# Patient Record
Sex: FEMALE | Race: BLACK OR AFRICAN AMERICAN
[De-identification: names, ages, dates, MRNs, and addresses within clinical notes are randomized per-mention and may not be internally consistent; named-entity substitution may affect disease eponyms.]

---

## 2017-09-11 ENCOUNTER — HOSPITAL ENCOUNTER (EMERGENCY)
Dept: HOSPITAL 35 - ER | Age: 57
Discharge: NURSING, REHAB OR LONG-TERM CARE | End: 2017-09-11
Payer: COMMERCIAL

## 2017-09-11 VITALS — HEIGHT: 63 IN | BODY MASS INDEX: 18.61 KG/M2 | WEIGHT: 105.01 LBS

## 2017-09-11 DIAGNOSIS — N39.0: ICD-10-CM

## 2017-09-11 DIAGNOSIS — R56.9: Primary | ICD-10-CM

## 2017-09-11 LAB
ALBUMIN SERPL-MCNC: 4.2 G/DL (ref 3.4–5)
ALP SERPL-CCNC: 78 U/L (ref 46–116)
ALT SERPL-CCNC: 21 U/L (ref 30–65)
ANION GAP SERPL CALC-SCNC: 8 MMOL/L (ref 7–16)
AST SERPL-CCNC: 26 U/L (ref 15–37)
BILIRUB DIRECT SERPL-MCNC: 0.1 MG/DL
BILIRUB SERPL-MCNC: 0.5 MG/DL
BILIRUB UR-MCNC: NEGATIVE MG/DL
BUN SERPL-MCNC: 12 MG/DL (ref 7–18)
CALCIUM SERPL-MCNC: 9.6 MG/DL (ref 8.5–10.1)
CHLORIDE SERPL-SCNC: 105 MMOL/L (ref 98–107)
CO2 SERPL-SCNC: 28 MMOL/L (ref 21–32)
COLOR UR: YELLOW
CREAT SERPL-MCNC: 1.3 MG/DL (ref 0.6–1)
GLUCOSE SERPL-MCNC: 100 MG/DL (ref 74–106)
KETONES UR STRIP-MCNC: NEGATIVE MG/DL
NITRITE UR QL STRIP: NEGATIVE
POTASSIUM SERPL-SCNC: 4.5 MMOL/L (ref 3.5–5.1)
PROT SERPL-MCNC: 7.7 G/DL (ref 6.4–8.2)
RBC # UR STRIP: NEGATIVE /UL
SODIUM SERPL-SCNC: 141 MMOL/L (ref 136–145)
SP GR UR STRIP: 1.01 (ref 1–1.03)
URINE GLUCOSE-RANDOM*: NEGATIVE
URINE PROTEIN (DIPSTICK): NEGATIVE
UROBILINOGEN UR STRIP-ACNC: 0.2 E.U./DL (ref 0.2–1)

## 2019-07-16 ENCOUNTER — HOSPITAL ENCOUNTER (EMERGENCY)
Dept: HOSPITAL 35 - ER | Age: 59
Discharge: HOME | End: 2019-07-16
Payer: COMMERCIAL

## 2019-07-16 VITALS — SYSTOLIC BLOOD PRESSURE: 119 MMHG | DIASTOLIC BLOOD PRESSURE: 68 MMHG

## 2019-07-16 VITALS — HEIGHT: 68 IN | BODY MASS INDEX: 19.4 KG/M2 | WEIGHT: 128 LBS

## 2019-07-16 DIAGNOSIS — E87.5: ICD-10-CM

## 2019-07-16 DIAGNOSIS — N18.9: ICD-10-CM

## 2019-07-16 DIAGNOSIS — G40.909: Primary | ICD-10-CM

## 2019-07-16 DIAGNOSIS — Z87.440: ICD-10-CM

## 2019-07-16 DIAGNOSIS — E78.5: ICD-10-CM

## 2019-07-16 DIAGNOSIS — Z79.899: ICD-10-CM

## 2019-07-16 LAB
ALBUMIN SERPL-MCNC: 4 G/DL (ref 3.4–5)
ALT SERPL-CCNC: 20 U/L (ref 30–65)
ANION GAP SERPL CALC-SCNC: 8 MMOL/L (ref 7–16)
AST SERPL-CCNC: 32 U/L (ref 15–37)
BASOPHILS NFR BLD AUTO: 0.5 % (ref 0–2)
BILIRUB SERPL-MCNC: 0.6 MG/DL
BILIRUB UR-MCNC: NEGATIVE MG/DL
BUN SERPL-MCNC: 14 MG/DL (ref 7–18)
CALCIUM SERPL-MCNC: 9.7 MG/DL (ref 8.5–10.1)
CHLORIDE SERPL-SCNC: 104 MMOL/L (ref 98–107)
CO2 SERPL-SCNC: 29 MMOL/L (ref 21–32)
COLOR UR: YELLOW
CREAT SERPL-MCNC: 1.6 MG/DL (ref 0.6–1)
EOSINOPHIL NFR BLD: 0.3 % (ref 0–3)
ERYTHROCYTE [DISTWIDTH] IN BLOOD BY AUTOMATED COUNT: 13.9 % (ref 10.5–14.5)
GLUCOSE SERPL-MCNC: 102 MG/DL (ref 74–106)
GRANULOCYTES NFR BLD MANUAL: 55.4 % (ref 36–66)
HCT VFR BLD CALC: 40 % (ref 37–47)
HGB BLD-MCNC: 13.1 GM/DL (ref 12–15)
KETONES UR STRIP-MCNC: NEGATIVE MG/DL
LYMPHOCYTES NFR BLD AUTO: 35.3 % (ref 24–44)
MAGNESIUM SERPL-MCNC: 1.9 MG/DL (ref 1.8–2.4)
MCH RBC QN AUTO: 29.2 PG (ref 26–34)
MCHC RBC AUTO-ENTMCNC: 32.8 G/DL (ref 28–37)
MCV RBC: 89 FL (ref 80–100)
MONOCYTES NFR BLD: 8.5 % (ref 1–8)
NEUTROPHILS # BLD: 3.1 THOU/UL (ref 1.4–8.2)
PLATELET # BLD: 302 THOU/UL (ref 150–400)
POTASSIUM SERPL-SCNC: 5.2 MMOL/L (ref 3.5–5.1)
PROT SERPL-MCNC: 7.9 G/DL (ref 6.4–8.2)
RBC # BLD AUTO: 4.49 MIL/UL (ref 4.2–5)
RBC # UR STRIP: NEGATIVE /UL
SODIUM SERPL-SCNC: 141 MMOL/L (ref 136–145)
SP GR UR STRIP: 1.01 (ref 1–1.03)
SQUAMOUS: (no result) /LPF (ref 0–3)
URINE CLARITY: CLEAR
URINE GLUCOSE-RANDOM*: NEGATIVE
URINE LEUKOCYTES-REFLEX: (no result)
URINE NITRITE-REFLEX: NEGATIVE
URINE PROTEIN (DIPSTICK): NEGATIVE
URINE WBC-REFLEX: (no result) /HPF (ref 0–5)
UROBILINOGEN UR STRIP-ACNC: 0.2 E.U./DL (ref 0.2–1)
WBC # BLD AUTO: 5.6 THOU/UL (ref 4–11)

## 2019-07-28 ENCOUNTER — HOSPITAL ENCOUNTER (EMERGENCY)
Dept: HOSPITAL 35 - ER | Age: 59
Discharge: HOME | End: 2019-07-28
Payer: COMMERCIAL

## 2019-07-28 VITALS — WEIGHT: 128 LBS | BODY MASS INDEX: 18.96 KG/M2 | HEIGHT: 69 IN

## 2019-07-28 VITALS — SYSTOLIC BLOOD PRESSURE: 123 MMHG | DIASTOLIC BLOOD PRESSURE: 79 MMHG

## 2019-07-28 DIAGNOSIS — S30.0XXA: ICD-10-CM
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
## 2019-07-28 DIAGNOSIS — Z79.899: ICD-10-CM

## 2019-07-28 DIAGNOSIS — S90.01XA: Primary | ICD-10-CM
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
## 2019-07-28 LAB
ANION GAP SERPL CALC-SCNC: 7 MMOL/L (ref 7–16)
BASOPHILS NFR BLD AUTO: 0.9 % (ref 0–2)
BILIRUB UR-MCNC: NEGATIVE MG/DL
BUN SERPL-MCNC: 11 MG/DL (ref 7–18)
CALCIUM SERPL-MCNC: 10.2 MG/DL (ref 8.5–10.1)
CHLORIDE SERPL-SCNC: 103 MMOL/L (ref 98–107)
CO2 SERPL-SCNC: 30 MMOL/L (ref 21–32)
COLOR UR: YELLOW
CREAT SERPL-MCNC: 1.3 MG/DL (ref 0.6–1)
EOSINOPHIL NFR BLD: 0.3 % (ref 0–3)
ERYTHROCYTE [DISTWIDTH] IN BLOOD BY AUTOMATED COUNT: 13.8 % (ref 10.5–14.5)
GLUCOSE SERPL-MCNC: 95 MG/DL (ref 74–106)
GRANULOCYTES NFR BLD MANUAL: 60.3 % (ref 36–66)
HCT VFR BLD CALC: 38.9 % (ref 37–47)
HGB BLD-MCNC: 12.8 GM/DL (ref 12–15)
KETONES UR STRIP-MCNC: NEGATIVE MG/DL
LYMPHOCYTES NFR BLD AUTO: 30.4 % (ref 24–44)
MCH RBC QN AUTO: 29.2 PG (ref 26–34)
MCHC RBC AUTO-ENTMCNC: 33 G/DL (ref 28–37)
MCV RBC: 88.5 FL (ref 80–100)
MONOCYTES NFR BLD: 8.1 % (ref 1–8)
NEUTROPHILS # BLD: 3 THOU/UL (ref 1.4–8.2)
PLATELET # BLD: 282 THOU/UL (ref 150–400)
POTASSIUM SERPL-SCNC: 3.8 MMOL/L (ref 3.5–5.1)
RBC # BLD AUTO: 4.4 MIL/UL (ref 4.2–5)
RBC # UR STRIP: NEGATIVE /UL
SODIUM SERPL-SCNC: 140 MMOL/L (ref 136–145)
SP GR UR STRIP: 1.01 (ref 1–1.03)
URINE CLARITY: CLEAR
URINE GLUCOSE-RANDOM*: NEGATIVE
URINE LEUKOCYTES-REFLEX: (no result)
URINE NITRITE-REFLEX: NEGATIVE
URINE PROTEIN (DIPSTICK): NEGATIVE
UROBILINOGEN UR STRIP-ACNC: 0.2 E.U./DL (ref 0.2–1)
WBC # BLD AUTO: 4.9 THOU/UL (ref 4–11)

## 2020-04-18 ENCOUNTER — HOSPITAL ENCOUNTER (INPATIENT)
Dept: HOSPITAL 35 - ER | Age: 60
LOS: 4 days | Discharge: NURSING, REHAB OR LONG-TERM CARE | DRG: 100 | End: 2020-04-22
Attending: INTERNAL MEDICINE | Admitting: INTERNAL MEDICINE
Payer: COMMERCIAL

## 2020-04-18 VITALS — SYSTOLIC BLOOD PRESSURE: 106 MMHG | DIASTOLIC BLOOD PRESSURE: 74 MMHG

## 2020-04-18 VITALS — DIASTOLIC BLOOD PRESSURE: 77 MMHG | SYSTOLIC BLOOD PRESSURE: 117 MMHG

## 2020-04-18 VITALS — BODY MASS INDEX: 23.07 KG/M2 | WEIGHT: 147 LBS | HEIGHT: 67 IN

## 2020-04-18 VITALS — SYSTOLIC BLOOD PRESSURE: 133 MMHG | DIASTOLIC BLOOD PRESSURE: 84 MMHG
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
## 2020-04-18 DIAGNOSIS — T42.0X5A: ICD-10-CM

## 2020-04-18 DIAGNOSIS — R47.1: ICD-10-CM

## 2020-04-18 DIAGNOSIS — Q04.3: ICD-10-CM

## 2020-04-18 DIAGNOSIS — F95.9: ICD-10-CM

## 2020-04-18 DIAGNOSIS — G93.40: ICD-10-CM

## 2020-04-18 DIAGNOSIS — Z79.899: ICD-10-CM

## 2020-04-18 DIAGNOSIS — E78.5: ICD-10-CM

## 2020-04-18 DIAGNOSIS — N18.3: ICD-10-CM

## 2020-04-18 DIAGNOSIS — G31.84: ICD-10-CM

## 2020-04-18 DIAGNOSIS — G40.401: Primary | ICD-10-CM

## 2020-04-18 DIAGNOSIS — R47.01: ICD-10-CM

## 2020-04-18 LAB
ALBUMIN SERPL-MCNC: 3.9 G/DL (ref 3.4–5)
ALT SERPL-CCNC: 23 U/L (ref 30–65)
ANION GAP SERPL CALC-SCNC: 10 MMOL/L (ref 7–16)
AST SERPL-CCNC: 24 U/L (ref 15–37)
BASOPHILS NFR BLD AUTO: 0.2 % (ref 0–2)
BILIRUB SERPL-MCNC: 0.4 MG/DL
BILIRUB UR-MCNC: NEGATIVE MG/DL
BUN SERPL-MCNC: 10 MG/DL (ref 7–18)
CALCIUM SERPL-MCNC: 9.6 MG/DL (ref 8.5–10.1)
CHLORIDE SERPL-SCNC: 105 MMOL/L (ref 98–107)
CO2 SERPL-SCNC: 26 MMOL/L (ref 21–32)
COLOR UR: YELLOW
CREAT SERPL-MCNC: 1.4 MG/DL (ref 0.6–1)
EOSINOPHIL NFR BLD: 0 % (ref 0–3)
ERYTHROCYTE [DISTWIDTH] IN BLOOD BY AUTOMATED COUNT: 13.6 % (ref 10.5–14.5)
GLUCOSE SERPL-MCNC: 126 MG/DL (ref 74–106)
GRANULOCYTES NFR BLD MANUAL: 82.8 % (ref 36–66)
HCT VFR BLD CALC: 38.5 % (ref 37–47)
HGB BLD-MCNC: 12.5 GM/DL (ref 12–15)
KETONES UR STRIP-MCNC: NEGATIVE MG/DL
LYMPHOCYTES NFR BLD AUTO: 12.5 % (ref 24–44)
MCH RBC QN AUTO: 29 PG (ref 26–34)
MCHC RBC AUTO-ENTMCNC: 32.4 G/DL (ref 28–37)
MCV RBC: 89.4 FL (ref 80–100)
MONOCYTES NFR BLD: 4.5 % (ref 1–8)
NEUTROPHILS # BLD: 6 THOU/UL (ref 1.4–8.2)
PLATELET # BLD: 301 THOU/UL (ref 150–400)
POTASSIUM SERPL-SCNC: 3.8 MMOL/L (ref 3.5–5.1)
PROT SERPL-MCNC: 7.5 G/DL (ref 6.4–8.2)
RBC # BLD AUTO: 4.3 MIL/UL (ref 4.2–5)
RBC # UR STRIP: NEGATIVE /UL
SODIUM SERPL-SCNC: 141 MMOL/L (ref 136–145)
SP GR UR STRIP: 1.02 (ref 1–1.03)
URINE CLARITY: CLEAR
URINE GLUCOSE-RANDOM*: NEGATIVE
URINE LEUKOCYTES-REFLEX: (no result)
URINE NITRITE-REFLEX: NEGATIVE
URINE PROTEIN (DIPSTICK): NEGATIVE
UROBILINOGEN UR STRIP-ACNC: 0.2 E.U./DL (ref 0.2–1)
WBC # BLD AUTO: 7.2 THOU/UL (ref 4–11)

## 2020-04-18 PROCEDURE — 10081 I&D PILONIDAL CYST COMP: CPT

## 2020-04-18 NOTE — NUR
PATIENT IS A NEW ADMISSION TO THE UNIT THIS SHIFT. SHE ARRIVED VIA CART FROM
THE ER AND WAS TRANSFERRED TO THE BED WITHOUT INCIDENT. PATIENT IS CONFUSED
AND UNABLE TO PARTICIPATE IN ADMISSION PROCESS. NURSE TO COMPLETE ADMISSION AS
HE IS ABLE AND INITIATE PLAN OF CARE.

## 2020-04-18 NOTE — EMS
[facility]
[address] [place]   [address]                     EMS Patient Care Report       
_______________________________________________________________________________
 
Name:       ADAL BLOUNT              Room #:                     REG MARIANGEL PARSONS#:      [id number]                       Account #:      [id number]  
Admission:  20    Attend Phys:                          
Discharge:              Date of Birth:  [birth year]  
                                                          Report #: [id number]
                                                                    [id number]
_______________________________________________________________________________
THIS REPORT FOR:   //name//                          
 
Report Transmitted: 2020 13:41
EMS Care Summary
[place], [place]/[facility]
Incident [id number] @ 2020 12:55
 
Incident Location
[address]
 
Patient
ADAL BLOUNT
Female, 60 Years
 1960
 
Patient Address
[address]
[place] [address]
 
Patient History
Seizures,Hyperlipidemia,
 
Patient Allergies
Other drug allergy,
 
Patient Medications
Ativan, Lipitor, Norco, Folic acid, Gabapentin, Lamictal, Senna, Tylenol,
 
Chief Complaint
FOCAL SEIZURE
 
Disposition
Transported No Lights/[place]
 
Dispatch Reason
Sick Person
 
Transported To
[facility]
 
Narrative
PT FOUND LYING IN BED. [facility] ALSO RESPONDED, BUT STAYED IN PUMPER. STAFF 
STATES THAT PT IS NORMALLY A&OX3 AND DOES WELL ON HER OWN. STAFF STATES THAT 
SHE HAS NOTED PT HAVING 5-10 SEIZURE LIKE EPISODES TODAY. STAFF STATES PT IS 
 
 
 
[facility]
[address] [place]   [address]                     EMS Patient Care Report       
_______________________________________________________________________________
 
Name:       ADAL BLOUNT              Room #:                     REG MARIANGEL PARSONS#:      [id number]                       Account #:      [id number]  
Admission:  20    Attend Phys:                          
Discharge:              Date of Birth:  [birth year]  
                                                          Report #: [id number]
                                                                    [id number]
_______________________________________________________________________________
NOT TALKING WITH HER LIE NORMAL AND IS JUST NOT RIGHT. STAFF STATES THEY DO NOT 
KNOW HOW LONG PT HAS BEEN HAVING THESE EPISODES AND THAT STAFF DID NOT REPORT 
THEM FROM YESTERDAY. STAFF STATES THAT SHE HAS NOTED PT NOT BEING HER NORMAL 
SELF SINCE BREAKFAST TODAY. PT DENIES SPECIFIC COMPLAINT. NO COVID SYMPTOMS 
REPORTED. PT ASSISTED OUT OF ROOM TO COT. SURGICAL MASK APLIED TO PT FOR 
TRANSPORT. TRANSPORTED WITHOUT INCIDENT. 
 
Initial Vitals
@13:14P: 105,R: 18,BP: 134/80,Pain: 0/10,GCS: 14,Glucose: 136,SpO2: 96,Revised 
Trauma: 12, 
 
Assessments
@13:05MENTAL:Confused,SKIN:No Abnormalities,HEENT:Head/Face: No 
Abnormalities,Eyes: No Abnormalities,Neck/Airway: No Abnormalities,LUNG 
SOUNDS:ABDOMEN:PELVIS//GI:EXTREMITIES:PULSE:NEURO:Tremors, 
 
Impression
Seizures
 
Procedures
@13:05ALS AssessmentResponse: UnchangedSucceeded
 
Timeline
12:53,Call Received
12:53,Dispatch Notified
12:55,Dispatched
12:56,En Route
13:00,On Scene
13:05,At Patient
13:05,ALS Assessment,Response: UnchangedSucceeded,
13:14,BP: 134/80 M,PULSE: 105,RR: 18 R,SPO2: 96 Ox,ETCO2:  ,B,PAIN: 
0,GCS: 14, 
13:20,Depart Scene
13:43,At Destination
13:50,Call Closed
 
Disclaimer
v1.1     Copyright 2020 [employer]
This EMS Care Summary contains data elements from the applicable legal record 
(which may be displayed differently). It is designed to provide pertinent 
information for the following purposes: continuity of care, clinical quality, 
and state data reporting. The complete legal record is available to ED staff 
and administrators of the receiving hospital in [employer]'s Patient Tracker. All data 
is provided "as is."

## 2020-04-19 VITALS — SYSTOLIC BLOOD PRESSURE: 164 MMHG | DIASTOLIC BLOOD PRESSURE: 79 MMHG

## 2020-04-19 VITALS — SYSTOLIC BLOOD PRESSURE: 110 MMHG | DIASTOLIC BLOOD PRESSURE: 70 MMHG

## 2020-04-19 VITALS — DIASTOLIC BLOOD PRESSURE: 79 MMHG | SYSTOLIC BLOOD PRESSURE: 126 MMHG

## 2020-04-19 VITALS — SYSTOLIC BLOOD PRESSURE: 111 MMHG | DIASTOLIC BLOOD PRESSURE: 75 MMHG

## 2020-04-19 VITALS — SYSTOLIC BLOOD PRESSURE: 108 MMHG | DIASTOLIC BLOOD PRESSURE: 70 MMHG

## 2020-04-19 VITALS — DIASTOLIC BLOOD PRESSURE: 67 MMHG | SYSTOLIC BLOOD PRESSURE: 97 MMHG

## 2020-04-19 VITALS — SYSTOLIC BLOOD PRESSURE: 108 MMHG | DIASTOLIC BLOOD PRESSURE: 6 MMHG

## 2020-04-19 NOTE — EKG
[facility]
[address]
[place]   [address]                     ELECTROCARDIOGRAM REPORT      
_______________________________________________________________________________
 
Name:       ADAL BLOUNT              Room #:         216-P       ADM IN  
M.R.#:      [id number]                       Account #:      [id number]  
Admission:  20    Attend Phys:    Cal Dacosta MD    
Discharge:              Date of Birth:  [birth year]  
                                                          Report #: [id number]
                                                                    [id number]
_______________________________________________________________________________
THIS REPORT FOR:  
 
cc:  Cal Dacosta MD,Cal Valdivia,Neymar RIBEIRO MD [facility]                                        ~
THIS REPORT FOR:   //name//                          
 
                         [facility] ED
                                       
Test Date:    2020               Test Time:    14:45:59
Pat Name:     ADAL BLOUNT               Department:   
Patient ID:   [id number]            Room:         216
Gender:       F                        Technician:   DARRIUS
:          1960               Requested By: Laura Raines
Order Number: [id number] MD:   Neymar Valdivia
                                 Measurements
Intervals                              Axis          
Rate:         103                      P:            61
OR:           168                      QRS:          90
QRSD:         101                      T:            41
QT:           351                                    
QTc:          460                                    
                           Interpretive Statements
Sinus tachycardia
Right atrial abnormality
Consider right ventricular hypertrophy
Compared to ECG 2017 08:02:28
No significant change was found
Electronically Signed On 2020 11:09:05 CDT by Neymar Valdivia
[email or web address]
 
 
 
 
 
 
 
 
 
 
 
 
 
 
  <ELECTRONICALLY SIGNED>
   By: Neymar Valdivia MD, [facility]   
  20     1109
D: 20 1445                           _____________________________________
T: 20 1445                           Neymar Valdivia MD, [facility]     /EPI

## 2020-04-19 NOTE — NUR
RECEIVED PT'S CARE AROUND 0710; PT. ON BED; ALERT; SR ON THE MONITOR; DURING
AM ASSESSMENT PT. AOX4; ST. "I WANT TO GO HOME"; EDUCATED ABOUT THE REASONS OF
BEING OF THE HOSPITAL; ST. UNDERSTANDING; AM MEDICATIONS GIVEN; ABLE TO
SWALLOW PILLS; DR. DE JESUS ROUNDING ON PT.; REQUESTED TO TALK TO DR. BENSON;
PHYSICIAN PAGED; TRANSFER CALLED; NO ST AT THE HOSPITAL; DR. DE JESUS NOTIFIED;
ORDERS RECEIVED; DR. BENSON UPDATE DURING ROUNDINGS; THROUGH THE DAY PT.
CAML; COOPERATIVE; NO SEIZURE ACTIVITY; ABLE TO STAND TO THE BED SIDE COMMODE
WITH ASSISSTANCE; SR ON THE MONITOR; ASSESSMENT AS CHARGED; FOLLOWING POC;
PASSED ON REPORT;

## 2020-04-20 VITALS — SYSTOLIC BLOOD PRESSURE: 117 MMHG | DIASTOLIC BLOOD PRESSURE: 68 MMHG

## 2020-04-20 VITALS — SYSTOLIC BLOOD PRESSURE: 114 MMHG | DIASTOLIC BLOOD PRESSURE: 79 MMHG

## 2020-04-20 VITALS — DIASTOLIC BLOOD PRESSURE: 66 MMHG | SYSTOLIC BLOOD PRESSURE: 108 MMHG

## 2020-04-20 VITALS — DIASTOLIC BLOOD PRESSURE: 62 MMHG | SYSTOLIC BLOOD PRESSURE: 121 MMHG

## 2020-04-20 VITALS — DIASTOLIC BLOOD PRESSURE: 58 MMHG | SYSTOLIC BLOOD PRESSURE: 101 MMHG

## 2020-04-20 NOTE — NUR
Assumed pt care this am, pt is very pleasant, alert and oriented forgetful
most of the time. will not call out when she needs to get up to go to the
toilet. No seizure was noted, pt was cnotinent for this shift. Pt has been
wanting to go home today and would request on numerous occassions to get her
clothes on, pt is redirectable. Seen by Dr. Dacosta, plan is for the pt to go
back to her facility tomorrow. POC follwed no signs or verbalizations of
distress have been noted.

## 2020-04-20 NOTE — NUR
PATIENT IS PROGRESSING IN HER CARE PLAN. VITAL SIGNS STABLE WITH PATIENT
HAVING NO COMPLAINTS OF PAIN OR NAUSEA. PATIENT HAS REMAINED FULLY ORIENTED
THROUGHOUT SHIFT. NO SIGNS OR SYMPTOMS OF SEIZURE LIKE ACTIVITY FROM PATIENT.
INCONTINENT FREE, SHE HAS BEEN ABLE TO AMBULATE TO BEDSIDE COMMODE WITH
ASSISTANCE INCIDENT FREE. PATIENT IS ANXIOUS FOR POTENTIAL DISCHARGE SOON.
CONTINUE PLAN OF CARE.

## 2020-04-20 NOTE — NUR
spoke with patient she admits with seizure. She resides at [facility]. Sp with RN at [facility]. Patient uses walker to ambulate.
Fax at [facility] [phone]. Therapy evals today. Plan return once stable
casemgt following.

## 2020-04-20 NOTE — NUR
FAXED CLINICAL UPDATE TO [facility] RECEIVED CONFIRMATION AND LEFT MSG WITH
ADM. DP TO FOLLOW.

## 2020-04-21 VITALS — SYSTOLIC BLOOD PRESSURE: 118 MMHG | DIASTOLIC BLOOD PRESSURE: 72 MMHG

## 2020-04-21 VITALS — SYSTOLIC BLOOD PRESSURE: 105 MMHG | DIASTOLIC BLOOD PRESSURE: 72 MMHG

## 2020-04-21 VITALS — DIASTOLIC BLOOD PRESSURE: 67 MMHG | SYSTOLIC BLOOD PRESSURE: 111 MMHG

## 2020-04-21 VITALS — SYSTOLIC BLOOD PRESSURE: 97 MMHG | DIASTOLIC BLOOD PRESSURE: 63 MMHG

## 2020-04-21 NOTE — NUR
ALERT,ABLE TO ANSWERS QUESTIONS.SLEPT ALMOST ALL NIGHT.NO SEIZURES
NOTED.MONITOR SHOWS SR.POC CONTINUED.

## 2020-04-21 NOTE — NUR
Assumed pt care this am, VS stable. Stayed on her recliner for most of the
day. POC followed, no signs or verbalizatiosn of distress have been noted. Pt
will be DC tomorrow as per PEGGY. endorsed to the night nurse.

## 2020-04-22 VITALS — SYSTOLIC BLOOD PRESSURE: 106 MMHG | DIASTOLIC BLOOD PRESSURE: 65 MMHG

## 2020-04-22 VITALS — SYSTOLIC BLOOD PRESSURE: 85 MMHG | DIASTOLIC BLOOD PRESSURE: 54 MMHG

## 2020-04-22 VITALS — DIASTOLIC BLOOD PRESSURE: 65 MMHG | SYSTOLIC BLOOD PRESSURE: 106 MMHG

## 2020-04-22 VITALS — SYSTOLIC BLOOD PRESSURE: 92 MMHG | DIASTOLIC BLOOD PRESSURE: 51 MMHG

## 2020-04-22 NOTE — NUR
PT DISCHARGING TODAY BACK TO [facility] FAXED DC ORDERS/SUMMARY TO
FACILITY SPOKE WITH CHAPO IN ADM SHE RECEIVED ORDERS THEY DO NOT HAVE
TRANSPORTATION AVAILABLE SO TRANSPORT ARRANGED THROUGH [facility] TRIP #[id number]
THEY WILL  BETWEEN [phone] TODAY. NOTIFIED PT'S DTR (PREMA) OF DC AND
TIME OF TRANSPORT. UNIT NOTIFIED AND CHART COPY PER US. RN TO CALL REPORT TO
[phone].

## 2020-04-22 NOTE — NUR
ASSUMED PT CARE AT 1900. PT IS ALERT AND ORIENTED BUT FORGETFUL. NO SIGN OF
DISTRESS NOTED IN PT. PT IS SITTING IN CHAIR AND TRANSFERRED TO BED. PT IS
STABLE. DENIES ANY PAIN. FALL PRECAUTION IN PLACE. ASSESSMENT COMPLETED AND
DOCUMENTED. SCHEDULED MEDS ADMINISTERED TO PT. TOLERATED PO INTAKE. NO ACUTE
EVENTS OVERNIGHT. DENIES ANY FURTHER NEEDS AT THIS TIME.

## 2020-04-22 NOTE — NUR
RECEIVED PHONE CALL WITH A FEMALE VOICE SAYING SHE WAS HERE TO TRANSPORT
PATIENT BACK TO ASSISTED LIVING. WC VAN ARRANGED BY [facility] AND EXPECTED. WHEELED
PATIENT DOWN TO E.D. ONLY TO DISCOVER A PRIVATE VEHICLE DRIVEN BY THE
PATIENT'S DTR. PATIENT WAS INPATIENT AND HAD CALLED HER DTR FOR TRANSPORT.
UNSURE IF TRANSPORT VIA PERSONAL VEHICLE IS ALLOWED. DTR BEGAN DROPPING THE
F-BOMB, STATING SHE IS TAKING HER MOTHER AND THAT'S THAT. JUST THEN A COVID-19
PATIENT EXITED THE E.D. DOOR AND WE WERE TOLD TO CLEAR OUT. PT'S DTR DROVE
AWAY AS WE CLEARED THE AREA. SECURITY ON HAND AND STATED IT WAS HANDLED
PROPERLY.

## 2020-04-24 NOTE — EEG
[facility]
[address]
[place]  [address]                      ELECTROENCEPHALOGRAM          
_______________________________________________________________________________
 
Name:       ADAL BLOUNT               Room #:         216-P       [facility] [place]  
M.R.#:      [id number]      Account #:      [id number]  
Admission:  04/18/20     Attend Phys:    Cal Dacosta MD   
Discharge:  04/22/20     Date of Birth:  [birth year]  
                                                           Report #: [id number]
    [id number]  
_______________________________________________________________________________
THIS REPORT FOR:   //name//                          
 
CC: Cal Dacosta
    [facility]
 
DATE OF SERVICE:  04/20/2020
 
 
This patient is being evaluated for altered mental status and seizure.
 
EEG was done by placing the electrode by standard 10-20 system of electrode
placement.  Both referential and sequential montages were used for recording. 
Background activity in this patient's EEG is about 8 Hz and 30 microvolt.  It is
intermixed with theta range slowing on both sides.  Photic stimulation was
unremarkable.  The patient became drowsy and that was associated with bilateral
slowing and vertex sharp waves.  No spike and slow wave activity was noticed.
 
IMPRESSION:  This patient's EEG does not demonstrate any pronounced epileptiform
activity.  It is somewhat intermixed with theta range slowing on both sides. 
That is a nonspecific finding, which can occur with dementia, encephalopathy,
effect of psychotropic medication.  EEG is somewhat unstable, but not clearly
epileptiform.
 
 
 
 
 
 
 
 
 
 
 
 
 
 
 
 
 
 
 
 
 
 
       <ELECTRONICALLY SIGNED>
   By: Brendon Haywood MD         
  04/24/20     1332
D: 04/20/20 1604                           _____________________________________
T: 04/20/20 1609                           Brnedon Haywood MD           /nt

## 2020-10-03 ENCOUNTER — HOSPITAL ENCOUNTER (EMERGENCY)
Dept: HOSPITAL 35 - ER | Age: 60
Discharge: HOME | End: 2020-10-03
Payer: COMMERCIAL

## 2020-10-03 VITALS — HEIGHT: 67 IN | WEIGHT: 130.01 LBS | BODY MASS INDEX: 20.4 KG/M2

## 2020-10-03 VITALS — SYSTOLIC BLOOD PRESSURE: 109 MMHG | DIASTOLIC BLOOD PRESSURE: 70 MMHG

## 2020-10-03 DIAGNOSIS — Z79.899: ICD-10-CM

## 2020-10-03 DIAGNOSIS — R56.9: Primary | ICD-10-CM

## 2020-10-03 LAB
ALBUMIN SERPL-MCNC: 4 G/DL (ref 3.4–5)
ALT SERPL-CCNC: 17 U/L (ref 30–65)
ANION GAP SERPL CALC-SCNC: 10 MMOL/L (ref 7–16)
AST SERPL-CCNC: 25 U/L (ref 15–37)
BASOPHILS NFR BLD AUTO: 1 % (ref 0–2)
BILIRUB SERPL-MCNC: 0.4 MG/DL (ref 0.2–1)
BILIRUB UR-MCNC: NEGATIVE MG/DL
BUN SERPL-MCNC: 12 MG/DL (ref 7–18)
CALCIUM SERPL-MCNC: 9.5 MG/DL (ref 8.5–10.1)
CHLORIDE SERPL-SCNC: 104 MMOL/L (ref 98–107)
CO2 SERPL-SCNC: 26 MMOL/L (ref 21–32)
COLOR UR: YELLOW
CREAT SERPL-MCNC: 1.2 MG/DL (ref 0.6–1)
ERYTHROCYTE [DISTWIDTH] IN BLOOD BY AUTOMATED COUNT: 14.7 % (ref 10.5–14.5)
GLUCOSE SERPL-MCNC: 108 MG/DL (ref 74–106)
GRANULOCYTES NFR BLD MANUAL: 82 % (ref 36–66)
HCT VFR BLD CALC: 36.1 % (ref 37–47)
HGB BLD-MCNC: 11.6 GM/DL (ref 12–15)
KETONES UR STRIP-MCNC: NEGATIVE MG/DL
LYMPHOCYTES NFR BLD AUTO: 9 % (ref 24–44)
MCH RBC QN AUTO: 28.2 PG (ref 26–34)
MCHC RBC AUTO-ENTMCNC: 32.2 G/DL (ref 28–37)
MCV RBC: 87.5 FL (ref 80–100)
MONOCYTES NFR BLD: 8 % (ref 1–8)
NEUTROPHILS # BLD: 6.6 THOU/UL (ref 1.4–8.2)
PLATELET # BLD: 337 THOU/UL (ref 150–400)
POTASSIUM SERPL-SCNC: 3.7 MMOL/L (ref 3.5–5.1)
PROT SERPL-MCNC: 7.6 G/DL (ref 6.4–8.2)
RBC # BLD AUTO: 4.13 MIL/UL (ref 4.2–5)
RBC # UR STRIP: NEGATIVE /UL
RBC MORPH BLD: NORMAL
SODIUM SERPL-SCNC: 140 MMOL/L (ref 136–145)
SP GR UR STRIP: 1.01 (ref 1–1.03)
URINE CLARITY: CLEAR
URINE GLUCOSE-RANDOM*: NEGATIVE
URINE LEUKOCYTES-REFLEX: NEGATIVE
URINE NITRITE-REFLEX: NEGATIVE
URINE PROTEIN (DIPSTICK): NEGATIVE
UROBILINOGEN UR STRIP-ACNC: 0.2 E.U./DL (ref 0.2–1)
WBC # BLD AUTO: 8 THOU/UL (ref 4–11)

## 2021-01-16 ENCOUNTER — HOSPITAL ENCOUNTER (EMERGENCY)
Dept: HOSPITAL 35 - ER | Age: 61
Discharge: NURSING, REHAB OR LONG-TERM CARE | End: 2021-01-16
Payer: COMMERCIAL

## 2021-01-16 VITALS — HEIGHT: 68 IN | WEIGHT: 120 LBS | BODY MASS INDEX: 18.19 KG/M2

## 2021-01-16 VITALS — DIASTOLIC BLOOD PRESSURE: 75 MMHG | SYSTOLIC BLOOD PRESSURE: 117 MMHG

## 2021-01-16 DIAGNOSIS — R56.9: Primary | ICD-10-CM

## 2021-01-16 DIAGNOSIS — Z79.899: ICD-10-CM

## 2021-01-16 LAB
ALBUMIN SERPL-MCNC: 4.3 G/DL (ref 3.4–5)
ALT SERPL-CCNC: 25 U/L (ref 14–59)
ANION GAP SERPL CALC-SCNC: 9 MMOL/L (ref 7–16)
AST SERPL-CCNC: 20 U/L (ref 15–37)
BASOPHILS NFR BLD AUTO: 0.3 % (ref 0–2)
BILIRUB SERPL-MCNC: 0.5 MG/DL (ref 0.2–1)
BILIRUB UR-MCNC: NEGATIVE MG/DL
BUN SERPL-MCNC: 14 MG/DL (ref 7–18)
CALCIUM SERPL-MCNC: 10.2 MG/DL (ref 8.5–10.1)
CHLORIDE SERPL-SCNC: 105 MMOL/L (ref 98–107)
CO2 SERPL-SCNC: 28 MMOL/L (ref 21–32)
COLOR UR: YELLOW
CREAT SERPL-MCNC: 1.4 MG/DL (ref 0.6–1)
EOSINOPHIL NFR BLD: 0 % (ref 0–3)
ERYTHROCYTE [DISTWIDTH] IN BLOOD BY AUTOMATED COUNT: 14.1 % (ref 10.5–14.5)
GLUCOSE SERPL-MCNC: 100 MG/DL (ref 74–106)
GRANULOCYTES NFR BLD MANUAL: 83 % (ref 36–66)
HCT VFR BLD CALC: 39.2 % (ref 37–47)
HGB BLD-MCNC: 12.6 GM/DL (ref 12–15)
KETONES UR STRIP-MCNC: NEGATIVE MG/DL
LYMPHOCYTES NFR BLD AUTO: 12.4 % (ref 24–44)
MCH RBC QN AUTO: 28.6 PG (ref 26–34)
MCHC RBC AUTO-ENTMCNC: 32.2 G/DL (ref 28–37)
MCV RBC: 88.8 FL (ref 80–100)
MONOCYTES NFR BLD: 4.3 % (ref 1–8)
NEUTROPHILS # BLD: 5 THOU/UL (ref 1.4–8.2)
PLATELET # BLD: 295 THOU/UL (ref 150–400)
POTASSIUM SERPL-SCNC: 4 MMOL/L (ref 3.5–5.1)
PROT SERPL-MCNC: 7.8 G/DL (ref 6.4–8.2)
RBC # BLD AUTO: 4.41 MIL/UL (ref 4.2–5)
RBC # UR STRIP: NEGATIVE /UL
SODIUM SERPL-SCNC: 142 MMOL/L (ref 136–145)
SP GR UR STRIP: 1.01 (ref 1–1.03)
URINE CLARITY: CLEAR
URINE GLUCOSE-RANDOM*: NEGATIVE
URINE LEUKOCYTES-REFLEX: NEGATIVE
URINE NITRITE-REFLEX: NEGATIVE
URINE PROTEIN (DIPSTICK): NEGATIVE
UROBILINOGEN UR STRIP-ACNC: 0.2 E.U./DL (ref 0.2–1)
WBC # BLD AUTO: 6 THOU/UL (ref 4–11)

## 2021-08-27 ENCOUNTER — HOSPITAL ENCOUNTER (INPATIENT)
Dept: HOSPITAL 35 - ER | Age: 61
LOS: 1 days | Discharge: HOME | DRG: 101 | End: 2021-08-28
Attending: INTERNAL MEDICINE | Admitting: INTERNAL MEDICINE
Payer: COMMERCIAL

## 2021-08-27 VITALS — SYSTOLIC BLOOD PRESSURE: 104 MMHG | DIASTOLIC BLOOD PRESSURE: 70 MMHG

## 2021-08-27 VITALS — HEIGHT: 65 IN | BODY MASS INDEX: 21.99 KG/M2 | WEIGHT: 132 LBS

## 2021-08-27 VITALS — DIASTOLIC BLOOD PRESSURE: 70 MMHG | SYSTOLIC BLOOD PRESSURE: 104 MMHG

## 2021-08-27 VITALS — DIASTOLIC BLOOD PRESSURE: 69 MMHG | SYSTOLIC BLOOD PRESSURE: 123 MMHG

## 2021-08-27 VITALS — DIASTOLIC BLOOD PRESSURE: 58 MMHG | SYSTOLIC BLOOD PRESSURE: 96 MMHG

## 2021-08-27 VITALS — SYSTOLIC BLOOD PRESSURE: 95 MMHG | DIASTOLIC BLOOD PRESSURE: 51 MMHG

## 2021-08-27 DIAGNOSIS — N17.9: ICD-10-CM

## 2021-08-27 DIAGNOSIS — N39.0: ICD-10-CM

## 2021-08-27 DIAGNOSIS — N18.9: ICD-10-CM

## 2021-08-27 DIAGNOSIS — E78.5: ICD-10-CM

## 2021-08-27 DIAGNOSIS — Z20.822: ICD-10-CM

## 2021-08-27 DIAGNOSIS — G40.909: Primary | ICD-10-CM

## 2021-08-27 DIAGNOSIS — Z79.899: ICD-10-CM

## 2021-08-27 DIAGNOSIS — Z87.820: ICD-10-CM

## 2021-08-27 DIAGNOSIS — Z79.82: ICD-10-CM

## 2021-08-27 DIAGNOSIS — D56.9: ICD-10-CM

## 2021-08-27 LAB
ALBUMIN SERPL-MCNC: 3.7 G/DL (ref 3.4–5)
ALT SERPL-CCNC: 22 U/L (ref 14–59)
ANION GAP SERPL CALC-SCNC: 10 MMOL/L (ref 7–16)
AST SERPL-CCNC: 27 U/L (ref 15–37)
BACTERIA-REFLEX: (no result) /HPF
BASOPHILS NFR BLD AUTO: 0.2 % (ref 0–2)
BILIRUB SERPL-MCNC: 0.5 MG/DL (ref 0.2–1)
BILIRUB UR-MCNC: NEGATIVE MG/DL
BUN SERPL-MCNC: 11 MG/DL (ref 7–18)
CALCIUM SERPL-MCNC: 9.1 MG/DL (ref 8.5–10.1)
CHLORIDE SERPL-SCNC: 108 MMOL/L (ref 98–107)
CO2 SERPL-SCNC: 25 MMOL/L (ref 21–32)
COLOR UR: YELLOW
CREAT SERPL-MCNC: 1.5 MG/DL (ref 0.6–1)
EOSINOPHIL NFR BLD: 0 % (ref 0–3)
ERYTHROCYTE [DISTWIDTH] IN BLOOD BY AUTOMATED COUNT: 14.4 % (ref 10.5–14.5)
GLUCOSE SERPL-MCNC: 121 MG/DL (ref 74–106)
GRANULOCYTES NFR BLD MANUAL: 84.1 % (ref 36–66)
HCT VFR BLD CALC: 37.9 % (ref 37–47)
HGB BLD-MCNC: 12.1 GM/DL (ref 12–15)
KETONES UR STRIP-MCNC: NEGATIVE MG/DL
LYMPHOCYTES NFR BLD AUTO: 11.7 % (ref 24–44)
MCH RBC QN AUTO: 28.2 PG (ref 26–34)
MCHC RBC AUTO-ENTMCNC: 32.1 G/DL (ref 28–37)
MCV RBC: 87.9 FL (ref 80–100)
MONOCYTES NFR BLD: 4 % (ref 1–8)
NEUTROPHILS # BLD: 4.1 THOU/UL (ref 1.4–8.2)
PLATELET # BLD: 294 THOU/UL (ref 150–400)
POTASSIUM SERPL-SCNC: 4.2 MMOL/L (ref 3.5–5.1)
PROT SERPL-MCNC: 7.2 G/DL (ref 6.4–8.2)
RBC # BLD AUTO: 4.31 MIL/UL (ref 4.2–5)
RBC # UR STRIP: NEGATIVE /UL
SODIUM SERPL-SCNC: 143 MMOL/L (ref 136–145)
SP GR UR STRIP: 1.01 (ref 1–1.03)
URINE CLARITY: CLEAR
URINE GLUCOSE-RANDOM*: NEGATIVE
URINE LEUKOCYTES-REFLEX: (no result)
URINE NITRITE-REFLEX: POSITIVE
URINE PROTEIN (DIPSTICK): NEGATIVE
URINE WBC-REFLEX: (no result) /HPF (ref 0–5)
UROBILINOGEN UR STRIP-ACNC: 0.2 E.U./DL (ref 0.2–1)
WBC # BLD AUTO: 4.9 THOU/UL (ref 4–11)

## 2021-08-27 PROCEDURE — 10045: CPT

## 2021-08-27 NOTE — NUR
ASSUMED PT CARE UPON ADMISSION TO UNIT AT 1700. PATIENT IS ALERT, BUT POST
ICTAL AND WILL NOT ANSWER QUESTIONS WHEN ASKED. PATIENT ALLOWED NURSE TO
LISTEN TO LUNGS, HEART, AND BOWEL SOUNDS, BUT DID NOT ALLOW SKIN TO BE
ASSESSED OR ANSWER ANY OTHER QUESTIONS VERBALLY FOR ADMISSION. [facility] ([facility]
[phone]) CONTACTED TO COMPLETE ADMISSION. [facility] WAS NOT SURE IF THE
PATIENT HAD COVID VACCINE OR NOT. [facility] REPORTS THAT ADAL IS NORMALLY
CONVERSIVE AND ORIENTED AT BASELINE. PATIENT PLACED ON TELE. SEIZURE
PRECAUTIONS PLACED. PATIENT ON ROOM AIR. FALL PRECAUTIONS ARE IN PLACE, CALL
LIGHT WITHIN REACH.

## 2021-08-27 NOTE — EEG
[facility]
[address]
[place]  [address]                      ELECTROENCEPHALOGRAM          
_______________________________________________________________________________
 
Name:       ADAL BLOUNT               Room #:         462-P       ADM IN  
M.R.#:      [id number]      Account #:      [id number]  
Admission:  08/27/21     Attend Phys:    Mily Mariscal MD  
Discharge:               Date of Birth:  [birth year]  
                                                           Report #: [id number]
    [id number]
_______________________________________________________________________________
THIS REPORT FOR:   //name//                          
 
DATE OF SERVICE: 08/27/2021
 
This patient is being evaluated for seizure.  EEG 1 was done by placing the 
electrode by standard 10-20 system of electrode placement.  Both referential and
sequential montages were used for recording.  Background activity in this 
patient's EEG is about 9 Hz and 30 microvolt. Frontally predominant spike and 
slow wave activities are present.  The patient goes to sleep, that is associated
with bilateral slowing and vertex sharp waves.
 
IMPRESSION:  This is an abnormal EEG because it appeared to be showing 
epileptiform activity arising from frontal area, especially on the left side, 
lot of artifact is present and therefore clinical correlation is recommended for
this finding to be significant.
 
 
 
 
 
 
 
 
 
 
 
 
 
 
 
 
 
 
 
 
 
 
 
 
 
 
 
 
 
                              
   By:                               
                   
D: 08/28/21 1225                           _____________________________________
T: 08/28/21 1450                           Brendon Haywood MD           /nt

## 2021-08-27 NOTE — EMS
[address]   [address]                     EMS Patient Care Report       
_______________________________________________________________________________
 
Name:       ADAL BLOUNT              Room #:         462-P       [facility] [place]  
M.R.#:      [id number]                       Account #:      [id number]  
Admission:  21    Attend Phys:    Mily Mariscal MD   
Discharge:  21    Date of Birth:  [birth year]  
                                                          Report #: [id number]
                                                                    [id number]
_______________________________________________________________________________
THIS REPORT FOR:   //name//                          
 
Report Transmitted: 2021 09:42
EMS Care Summary
[place], [place]/[facility]
Incident [id number] @ 2021 09:53
 
Incident Location
[address]
 
Patient
JOSE ALEJANDRO GALEAS
Female, 61 Years
 1960
 
Patient Address
[address] [place] [address]
[address]
[place] [address]
 
Patient History
Dementia,Seizures,Gastro-Esophageal Reflux Disease (GERD),Constipation,Whooping 
Cough (Pertussis),Vertigo, 
 
Patient Allergies
No known allergies,
 
Patient Medications
Lamictal, Vitamin D, Lorazepam, Vitamin B12, Norco,
 
Chief Complaint
seizures
 
Disposition
Transported No Lights/[place]
 
Dispatch Reason
Convulsions/Seizure
 
Transported To
[facility]
 
Narrative
62 y/o female seizure pt
 
 
 
 
[facility]
[address] [place]   [address]                     EMS Patient Care Report       
_______________________________________________________________________________
 
Name:       ADAL BLOUNT              Room #:         462-P       [facility] [place]  
M.R.#:      [id number]                       Account #:      [id number]  
Admission:  21    Attend Phys:    Mily Mariscal MD   
Discharge:  21    Date of Birth:  [birth year]  
                                                          Report #: [id number]
                                                                    [id number]
_______________________________________________________________________________
Upon arrival the pt was lying on the floor on her L side in the fetal position. 
 The pt is A&O x 1 with a GCS of 15.  She has a patent airway, breathing is 
normal, and has a strong reg radial pulse. The staff stated that she had 2 
seizures.  They are unsure about the length of the seizure.  Her boyfriend 
witnessed her seizures.  The pt was picked up off the floor placed on the cot 
in a position of comfort, secured to the cot and loaded into the ambulance.  VS 
were obtained, ECG is SR, a 20 g lock was placed in her LAC,  D-133.  The pt 
was transported to [place] per [facility] assisted staff request.  The pt was postictal 
state for the duration of transport to [place].  The pt was taken to her room, 
EMS gave report and returned to service. 
 
Initial Vitals
@10:09P: 89,CO: 5,SpO2: 99,
@10:11P: 94,BP: 131/87,CO: 3,SpO2: 100,
@10:05P: 93,R: 16,BP: 124/73,Pain: 0/10,GCS: 15,Glucose: 133,SpO2: 100,Revised 
Trauma: 12, 
 
Assessments
@10:16MENTAL:Event Oriented,Place Oriented,Time Oriented,Person 
Oriented,SKIN:HEENT:Head/Face: No Abnormalities,Neck/Airway: No 
Abnormalities,LUNG SOUNDS:General: No Abnormalities,Left Upper: No 
Abnormalities,Right Upper: No Abnormalities,Left Lower: No Abnormalities,Right 
Lower: No Abnormalities,ABDOMEN:General: No Abnormalities,Left Upper: No 
Abnormalities,Right Upper: No Abnormalities,Left Lower: No Abnormalities,Right 
Lower: No Abnormalities,PELVIS//GI:No Abnormalities,EXTREMITIES:Capillary 
Refill: Right Upper: < 2 Sec,Capillary Refill: Left Upper: < 2 Sec,Left Arm: No 
Abnormalities,Right Arm: No Abnormalities,Left Leg: No Abnormalities,Right Leg: 
No Abnormalities,PULSE:Radial: 2+ Normal,NEURO:No Abnormalities, 
 
Impression
Seizures
 
Procedures
@10:14ALS AssessmentResponse: UnchangedSucceeded@10:153-Lead ECGResponse: 
UnchangedSucceeded@10:143-Lead ECGResponse: UnchangedSucceeded@10:08Saline Lock 
10cc (20 ga) Site: Antecubital-LeftResponse: UnchangedSucceeded 
 
Timeline
09:51,Call Received
09:51,Dispatch Notified
09:53,Dispatched
09:54,En Route
09:56,On Scene
09:57,At Patient
10:05,BP: 124/73 M,PULSE: 93,RR: 16 R,SPO2: 100 Ox,ETCO2:  ,B,PAIN: 
0,GCS: 15, 
 
 
 
[address]   [address]                     EMS Patient Care Report       
_______________________________________________________________________________
 
Name:       ADAL BLOUNT BRIGDETTE              Room #:         462-P       [facility] [place]  
M.R.#:      [id number]                       Account #:      [id number]  
Admission:  21    Attend Phys:    Mily Mariscal MD   
Discharge:  21    Date of Birth:  [birth year]  
                                                          Report #: [id number]
                                                                    [id number]
_______________________________________________________________________________
10:08,Saline Lock 10cc 20 ga Site: Antecubital-Left,Response: 
UnchangedSucceeded, 
10:09,BP: / M,PULSE: 89,RR:  R,SPO2: 99 Ox,ETCO2:  ,BG: ,PAIN: ,GCS: ,
10:11,BP: 131/87 M,PULSE: 94,RR:  R,SPO2: 100 Ox,ETCO2:  ,BG: ,PAIN: ,GCS: ,
10:11,Depart Scene
10:14,ALS Assessment,Response: UnchangedSucceeded,
10:14,3-Lead ECG,Response: UnchangedSucceeded,
10:15,3-Lead ECG,Response: UnchangedSucceeded,
10:27,At Destination
10:40,Call Closed
 
Disclaimer
v1.1     Copyright  [employer]
This EMS Care Summary contains data elements from the applicable legal record 
(which may be displayed differently). It is designed to provide pertinent 
information for the following purposes: continuity of care, clinical quality, 
and state data reporting. The complete legal record is available to ED staff 
and administrators of the receiving hospital in [employer]'s Patient Tracker. All data 
is provided "as is."

## 2021-08-27 NOTE — HC
[facility]
[address]
[place]   [address]                     CONSULTATION                  
_______________________________________________________________________________
 
Name:       ADAL BLOUNT              Room #:         462-P       ADM IN  
M.R.#:      [id number]                       Account #:      [id number]  
Admission:  08/27/21    Attend Phys:    Mily Mariscal MD   
Discharge:              Date of Birth:  [birth year]  
                                                          Report #: [id number]
                                                                    [id number] 
_______________________________________________________________________________
THIS REPORT FOR:  
 
cc:  Cal Dacosta MD, Ramilo MD Khosla,Brendon GUPTA MD                                         ~
 
DATE OF SERVICE: 08/27/2021
 
HISTORY OF PRESENT ILLNESS:  This is a 61-year-old female patient who is unable 
to provide any history.  The patient opens her eyes, but does not do anything 
for me.  I talked to Emergency Room nurse practitioner multiple times in this 
patient.  Subsequently, I reviewed her old record and I was able to find a 
number for the sister, whom I talked.  The patient used to live with her sister,
but she got busy with her job and then the patient started living in assisted 
living.  It looks like she was having seizure with fair regularity and there was
nobody at home, which can help her with a seizure and she was sent to the 
nursing home.  I did not have any records except in the computer, which I 
reviewed.  It looks like this patient goes to [facility] for her 
seizure management, but one of the notes indicated that she also went to Dr. Kaiser who was a neurologist at [place], but now is in [place].  She notes as 
far back as 2014.  According to the family, she used to be on Dilantin, but 
Dilantin was stopped because "new medication came."
 
She was admitted with 3 seizures today.  She got some Ativan and when I saw her,
she is completely out.
 
I talked to the people where she lives, but I cannot get any history about what 
happened during the seizure whether she hit her head or neck and then I talked 
to the sister and she provided a lot of history.  She insists she was never on 
Keppra, but the record indicated at one time she used to be on Keppra.  She says
that she have seizure when she misses her medication.  The assisted living pupil
insists that she did not miss out any medication.  She got the 8 o'clock dose 
and she got her dose last night.  She is on 200 mg p.o. b.i.d. of Vimpat.  She 
is on 200 mg of Lamictal, which is a pretty good dose, but people abuse higher 
dose than that.  I do not know which neurologist she follow up with.
 
REVIEW OF SYSTEMS:  That is the relevant review system I can get.  Past medical 
history is positive for seizure.  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 
injury.  I do not think the history is definite that the best I can tell from 
the sister.  She never had any severe injury for which she was admitted to the 
hospital.  During some of these seizures, she fell and her daughter thought that
she may have had injury to the brain at that time and that is the cause of the 
seizure.  In any event, she is functional.  She can ambulate and according to 
the family, she becomes pretty much out after the seizure, but then in the 
baseline, she is able to function reasonably well.  This is all the relevant 
review systems I can get.
 
 
 
[facility]
[address] [place]   [address]                     CONSULTATION                  
_______________________________________________________________________________
 
Name:       ADAL BLOUNT              Room #:         462-P       [facility] [place]  
M.R.#:      [id number]                       Account #:      [id number]  
Admission:  08/27/21    Attend Phys:    Mily Mariscal MD   
Discharge:              Date of Birth:  [birth year]  
                                                          Report #: [id number]
                                                                    [id number] 
_______________________________________________________________________________
 
 
PAST MEDICAL HISTORY:  Positive for seizure.  From the record here and from the 
history, it looks like there are fairly frequent and needs better control.
 
FAMILY HISTORY:  Unremarkable.
 
SOCIAL HISTORY:  She is in assisted living.
 
PHYSICAL EXAMINATION:  Pretty limited.  She opened her eyes.  She did not talk 
and she did not follow any commands.  Cardiac examinations appear unremarkable. 
No respiratory difficulty was noticed.  We will try to reexamine her some other 
time.
 
IMPRESSION:  Breakthrough seizure in a patient who has numerous seizures in the 
past and looks like a pretty frequent.  I do not have any prior records except 
for what is in the computer.  For the acute management, I think the best will be
to restart the Keppra because the family is pretty certain that she is not 
allergic to Keppra or have any problem with Keppra.  If she is able to swallow 
fairly soon, then we can restart her Vimpat and Lamictal.  Lamictal dose can be 
increased somewhat to better control the seizures.  We will also leave her on 
Keppra.  This patient already is on 2 potent anticonvulsant and still keep 
having seizures.  Other options like a vagus nerve stimulator need to be 
considered in this patient and I will defer that to her neurologist and probably
an epileptologist ___.  She has some nitrite positive and bacteria in the urine 
and I will defer to the hospitalist and if they think that needs treatment 
because any infection can trigger the seizure.  I will check her EEG is some 
time.  I spent more than 50 minute of time taking care of this patient today and
majority was spent counseling and coordinating.
 
 
 
 
 
 
 
 
 
 
 
 
 
 
 
                         
   By:                               
                   
D: 08/27/21 1205                           _____________________________________
T: 08/27/21 0311                           Brendon Haywood MD           /nt

## 2021-08-27 NOTE — EKG
[place] [address] [facility] [employer]
[place]  [address]
Phone:  [phone]                    ELECTROCARDIOGRAM REPORT      
_______________________________________________________________________________
 
Name:       ADAL BLOUNT              [id number]     Account #:      [id number]  
Admission:  21    Attend Phys:                          
Discharge:              Date of Birth:  [birth year]  
                                                          Report #: [id number]
   [id number]
_______________________________________________________________________________
                         [facility] ED
                                       
Test Date:    2021               Test Time:    10:29:48
Pat Name:     ADAL BLOUNT               Department:   
Patient ID:   [id number]            Room:          
Gender:       F                        Technician:   
:          1960               Requested By: Mary Ann Cho
Order Number: [id number] MD:   Felix Olea
                                 Measurements
Intervals                              Axis          
Rate:         88                       P:            70
MD:           197                      QRS:          85
QRSD:         102                      T:            54
QT:           373                                    
QTc:          452                                    
                           Interpretive Statements
Sinus rhythm
Biatrial enlargement
Consider right ventricular hypertrophy
Compared to ECG 2020 14:45:59
Sinus tachycardia no longer present
Electronically Signed On 2021 12:04:52 CDT by Felix Olea
[email or web address]
 
 
 
 
 
 
 
 
 
 
 
 
 
 
 
 
 
 
 
 
  <ELECTRONICALLY SIGNED>
   By: Felix Olea MD, [facility]    
  21     1204
D: 21 1029                           _____________________________________
T: 21 1029                           Felix Olea MD, FACC      /EPI

## 2021-08-28 VITALS — SYSTOLIC BLOOD PRESSURE: 110 MMHG | DIASTOLIC BLOOD PRESSURE: 65 MMHG

## 2021-08-28 LAB
ANION GAP SERPL CALC-SCNC: 11 MMOL/L (ref 7–16)
BASOPHILS NFR BLD AUTO: 0.7 % (ref 0–2)
BUN SERPL-MCNC: 10 MG/DL (ref 7–18)
CALCIUM SERPL-MCNC: 8.8 MG/DL (ref 8.5–10.1)
CHLORIDE SERPL-SCNC: 108 MMOL/L (ref 98–107)
CO2 SERPL-SCNC: 26 MMOL/L (ref 21–32)
CREAT SERPL-MCNC: 1.3 MG/DL (ref 0.6–1)
EOSINOPHIL NFR BLD: 0.3 % (ref 0–3)
ERYTHROCYTE [DISTWIDTH] IN BLOOD BY AUTOMATED COUNT: 14.3 % (ref 10.5–14.5)
GLUCOSE SERPL-MCNC: 87 MG/DL (ref 74–106)
GRANULOCYTES NFR BLD MANUAL: 43.5 % (ref 36–66)
HCT VFR BLD CALC: 35.3 % (ref 37–47)
HGB BLD-MCNC: 11.7 GM/DL (ref 12–15)
LYMPHOCYTES NFR BLD AUTO: 48 % (ref 24–44)
MAGNESIUM SERPL-MCNC: 1.8 MG/DL (ref 1.8–2.4)
MCH RBC QN AUTO: 29 PG (ref 26–34)
MCHC RBC AUTO-ENTMCNC: 33.2 G/DL (ref 28–37)
MCV RBC: 87.4 FL (ref 80–100)
MONOCYTES NFR BLD: 7.5 % (ref 1–8)
NEUTROPHILS # BLD: 2.3 THOU/UL (ref 1.4–8.2)
PLATELET # BLD: 258 THOU/UL (ref 150–400)
POTASSIUM SERPL-SCNC: 3.6 MMOL/L (ref 3.5–5.1)
RBC # BLD AUTO: 4.04 MIL/UL (ref 4.2–5)
SODIUM SERPL-SCNC: 145 MMOL/L (ref 136–145)
WBC # BLD AUTO: 5.3 THOU/UL (ref 4–11)

## 2021-08-28 NOTE — NUR
Pt. rested quietly during the night when checked on during frequent rounds.
She did get up and ambulate to the bathroom with assistance.  No c/o pain.
No noted seizure activity.  Bed alarm is on.

## 2021-08-28 NOTE — NUR
PATIENT WAS FOUND IN BED, SOAKED IN NORMAL SALINE. PATIENT HAD PULLED IV OUT
BETWEEN 0730 TO BREAKFAST TIME. AT 0730 PATIENT EXPRESSED WANTING TO GO HOME.
PROVIDER WAS PAGED AND TOLD SHE PULLED IV OUT; PATIENT IS A&OX4  BUT STILL
SEEMS POSTICTAL W SOME RESIDUAL CONFUSION. PATIENT WORKED WITH PT THIS DAY;
DEEMED A SBA W WALKER. WILL UPDATE NEURO W PATIENT DESIRE TO DISCHARGE. FALL
AND SIEZURE PRECAUTIONS REMIAN IN PLACE. WILL CONTINUE TO MONITOR